# Patient Record
Sex: FEMALE | Race: WHITE
[De-identification: names, ages, dates, MRNs, and addresses within clinical notes are randomized per-mention and may not be internally consistent; named-entity substitution may affect disease eponyms.]

---

## 2021-05-22 ENCOUNTER — HOSPITAL ENCOUNTER (INPATIENT)
Dept: HOSPITAL 19 - LDRO | Age: 26
LOS: 1 days | Discharge: HOME | End: 2021-05-23
Attending: OBSTETRICS & GYNECOLOGY | Admitting: OBSTETRICS & GYNECOLOGY
Payer: COMMERCIAL

## 2021-05-22 VITALS — BODY MASS INDEX: 27.22 KG/M2 | HEIGHT: 65 IN | WEIGHT: 163.36 LBS

## 2021-05-22 VITALS — HEART RATE: 73 BPM | DIASTOLIC BLOOD PRESSURE: 87 MMHG | SYSTOLIC BLOOD PRESSURE: 138 MMHG

## 2021-05-22 VITALS — HEART RATE: 82 BPM | SYSTOLIC BLOOD PRESSURE: 135 MMHG | DIASTOLIC BLOOD PRESSURE: 69 MMHG

## 2021-05-22 VITALS — HEART RATE: 73 BPM | SYSTOLIC BLOOD PRESSURE: 145 MMHG | DIASTOLIC BLOOD PRESSURE: 89 MMHG

## 2021-05-22 VITALS — DIASTOLIC BLOOD PRESSURE: 73 MMHG | SYSTOLIC BLOOD PRESSURE: 134 MMHG | HEART RATE: 82 BPM

## 2021-05-22 VITALS — DIASTOLIC BLOOD PRESSURE: 67 MMHG | HEART RATE: 90 BPM | SYSTOLIC BLOOD PRESSURE: 111 MMHG | TEMPERATURE: 98.1 F

## 2021-05-22 VITALS — SYSTOLIC BLOOD PRESSURE: 137 MMHG | DIASTOLIC BLOOD PRESSURE: 82 MMHG

## 2021-05-22 VITALS — SYSTOLIC BLOOD PRESSURE: 144 MMHG | DIASTOLIC BLOOD PRESSURE: 90 MMHG | HEART RATE: 86 BPM

## 2021-05-22 DIAGNOSIS — Z3A.38: ICD-10-CM

## 2021-05-22 DIAGNOSIS — F41.9: ICD-10-CM

## 2021-05-22 DIAGNOSIS — D64.9: ICD-10-CM

## 2021-05-22 LAB
BASOPHILS # BLD: 0.1 10*3/UL (ref 0–0.2)
BASOPHILS NFR BLD AUTO: 0.6 % (ref 0–2)
EOSINOPHIL # BLD: 0.1 10*3/UL (ref 0–0.7)
EOSINOPHIL NFR BLD: 0.9 % (ref 0–4)
ERYTHROCYTE [DISTWIDTH] IN BLOOD BY AUTOMATED COUNT: 15.5 % (ref 11.5–14.5)
GRANULOCYTES # BLD AUTO: 56.3 % (ref 42.2–75.2)
HCT VFR BLD AUTO: 31.3 % (ref 37–47)
HGB BLD-MCNC: 10.1 G/DL (ref 12.5–16)
LYMPHOCYTES # BLD: 3.8 10*3/UL (ref 1.2–3.4)
LYMPHOCYTES NFR BLD: 32.9 % (ref 20–51)
MCH RBC QN AUTO: 25 PG (ref 27–31)
MCHC RBC AUTO-ENTMCNC: 32 G/DL (ref 33–37)
MCV RBC AUTO: 78 FL (ref 80–100)
MONOCYTES # BLD: 1 10*3/UL (ref 0.1–0.6)
MONOCYTES NFR BLD AUTO: 8.9 % (ref 1.7–9.3)
NEUTROPHILS # BLD: 6.5 10*3/UL (ref 1.4–6.5)
PLATELET # BLD AUTO: 365 K/MM3 (ref 130–400)
PMV BLD AUTO: 11.7 FL (ref 7.4–10.4)
RBC # BLD AUTO: 4.03 M/MM3 (ref 4.1–5.3)

## 2021-05-22 NOTE — NUR
2030- CONSENTS SIGNED BY .
2120- IV TO SALINE LOCK, PT ASSISTED TO BATHROOM. ABLE TO VOID 700ML WITHOUT
DIFFICULTY. PT PERFORMS PERICARE. CLEAN GOWN, PAD, PANTIES PROVIDED. NORMAL
LOCHIA DISCUSSED AND PT VERBALIZES UNDERSTANDING.
2140- PT ASSISTED TO AMBULATE TO POSTPARTUM ROOM 208. BELONGINGS WITH PT. PT
ORIENTED TO ROOM AND CALL LIGHTS. PLAN OF CARE DISCUSSED AND QUESTIONS
ANSWERED. PT REQUESTS MOTRIN AND SOMETHING TO EAT.

## 2021-05-22 NOTE — NUR
1846- PT PRESENTS TO LDR COMPLAINING OF LEAKING FLUID, AMBULATORY TO ROOM LR5,
CHANGED INTO GOWN.
1854- EFM X2 APPLIED. PT HAS BEEN SQUATTING NEXT TO BED LEAKING FLUID. FLUID
TESTS POSITIVE FOR AMNIOTIC FLUID, IT IS CLEAR. PT DENIES VAG BLEEDING AND
STATES SHE IS VIRI REGULARLY ALL DAY AND IS FEELING BABY MOVE. PLAN OF
CARE FOR LABOR DISCUSSED AND QUESTIONS ANSWERED.
1900- SVE BY THIS NURSE 4/90/-2 WITH COPIOUS AMOUNTS OF CLEAR FLUID. ADMISSION
DISCUSSED
1910- NURSING ADMISSION HISTORY AND ASSESSMENT COMPLETE. DR GOODPASTURE CALLED
AND UPDATED AS CHARTED, ORDERS FOR ADMISSION.
1925- IV START TO LEFT WRIST AS CHARTED, BLOOD DRAWN FOR LAB. LR INFUSING FOR
EPIDURAL. PT REPORTS INCREASING DISCOMFORT.
1930- DUANE CRNA CALLED FOR EPIDURAL PLACEMENT.
1935- PT IS HEARD SCREAMING FROM ANOTHER ROOM AND NURSE TO BEDSIDE. PT IS
SQUATTING AT HEAD OF BED BEARING DOWN WITH  AND MOTHER AT BEDSIDE. PT
REPORTS PRESSURE AND NEED FOR BOWEL MOVEMENT. SVE BY THIS NURSE IN HANDS AND
KNEES, COMPLETE AND +3. PT ENCOURAGED NOT TO PUSH, TO BREATHE THROUGH
CONTRACTIONS. PT REPORTS THAT SHE CANNOT CONTROL IT. NURSERY AND ANOTHER 
DECHANT RN CALLED TO BEDSIDE TO ASSIST.
1938- DR GOODPASTURE CALLED FOR IMMINENT DELIVERY, SHE IS ON HER WAY.
1940- PT CONTINUES TO PUSH SPONTANEOUSLY AND DELIVERS ON HANDS AND KNEES WITH
THIS NURSE IN ATTENDENCE, VIABLE FEMALE, VIGOROUS, TO CARE OF NURSERY STAFF.
1942- PT ASSISTED TO LAY DOWN IN BED AND CLEAN UNDERBUTT PAD TO AWAIT DR
GOODPASTURE FOR DELIVERY OF PLACENTA.
1946- DR GOODPASTURE AT BEDSIDE. PT POSITIONED IN FOOTPLATES.
1949- SPONTANEOUS DELIVERY OF PLACENTA, EXAMINED BY DR GOODPASTURE, NO REPAIR
NEEDED. PITOCIN INFUSING AT POSTPARTUM DOSE.
1955- PERICARE PROVIDED. ICEPACK TO PERINEUM. FEET DOWN FROM FOOTPLATES AND
POSTPARTUM RECOVERY STARTED.

## 2021-05-23 VITALS — SYSTOLIC BLOOD PRESSURE: 133 MMHG | TEMPERATURE: 98.2 F | DIASTOLIC BLOOD PRESSURE: 70 MMHG | HEART RATE: 7 BPM

## 2021-05-23 VITALS — DIASTOLIC BLOOD PRESSURE: 85 MMHG | HEART RATE: 93 BPM | SYSTOLIC BLOOD PRESSURE: 129 MMHG | TEMPERATURE: 98 F

## 2021-05-23 VITALS — SYSTOLIC BLOOD PRESSURE: 129 MMHG | HEART RATE: 93 BPM | TEMPERATURE: 98 F | DIASTOLIC BLOOD PRESSURE: 85 MMHG

## 2021-05-23 VITALS — TEMPERATURE: 98.5 F | DIASTOLIC BLOOD PRESSURE: 80 MMHG | HEART RATE: 69 BPM | SYSTOLIC BLOOD PRESSURE: 134 MMHG

## 2021-05-23 VITALS — TEMPERATURE: 97.7 F | DIASTOLIC BLOOD PRESSURE: 66 MMHG | HEART RATE: 72 BPM | SYSTOLIC BLOOD PRESSURE: 120 MMHG

## 2021-05-23 VITALS — DIASTOLIC BLOOD PRESSURE: 75 MMHG | HEART RATE: 78 BPM | TEMPERATURE: 97.9 F | SYSTOLIC BLOOD PRESSURE: 135 MMHG

## 2021-05-23 VITALS — HEART RATE: 84 BPM | DIASTOLIC BLOOD PRESSURE: 80 MMHG | TEMPERATURE: 97.8 F | SYSTOLIC BLOOD PRESSURE: 125 MMHG

## 2023-02-13 ENCOUNTER — HOSPITAL ENCOUNTER (INPATIENT)
Dept: HOSPITAL 19 - LDR | Age: 28
LOS: 2 days | Discharge: HOME | End: 2023-02-15
Attending: OBSTETRICS & GYNECOLOGY | Admitting: OBSTETRICS & GYNECOLOGY
Payer: COMMERCIAL

## 2023-02-13 VITALS — HEIGHT: 65 IN | WEIGHT: 160.28 LBS | BODY MASS INDEX: 26.7 KG/M2

## 2023-02-13 DIAGNOSIS — F41.9: ICD-10-CM

## 2023-02-13 DIAGNOSIS — Z3A.40: ICD-10-CM

## 2023-02-13 DIAGNOSIS — O48.0: Primary | ICD-10-CM

## 2023-02-13 DIAGNOSIS — Z23: ICD-10-CM

## 2023-02-13 DIAGNOSIS — Z86.16: ICD-10-CM

## 2023-02-14 VITALS — DIASTOLIC BLOOD PRESSURE: 64 MMHG | SYSTOLIC BLOOD PRESSURE: 109 MMHG | HEART RATE: 88 BPM | TEMPERATURE: 97.8 F

## 2023-02-14 VITALS — TEMPERATURE: 97.8 F | SYSTOLIC BLOOD PRESSURE: 123 MMHG | DIASTOLIC BLOOD PRESSURE: 67 MMHG | HEART RATE: 68 BPM

## 2023-02-14 VITALS — SYSTOLIC BLOOD PRESSURE: 138 MMHG | DIASTOLIC BLOOD PRESSURE: 78 MMHG | HEART RATE: 83 BPM | TEMPERATURE: 96.5 F

## 2023-02-14 VITALS — DIASTOLIC BLOOD PRESSURE: 66 MMHG | SYSTOLIC BLOOD PRESSURE: 114 MMHG | HEART RATE: 95 BPM

## 2023-02-14 VITALS — HEART RATE: 64 BPM | TEMPERATURE: 98.2 F | DIASTOLIC BLOOD PRESSURE: 73 MMHG | SYSTOLIC BLOOD PRESSURE: 112 MMHG

## 2023-02-14 VITALS — SYSTOLIC BLOOD PRESSURE: 128 MMHG | HEART RATE: 80 BPM | DIASTOLIC BLOOD PRESSURE: 58 MMHG

## 2023-02-14 VITALS — HEART RATE: 86 BPM | SYSTOLIC BLOOD PRESSURE: 124 MMHG | DIASTOLIC BLOOD PRESSURE: 66 MMHG

## 2023-02-14 VITALS — DIASTOLIC BLOOD PRESSURE: 59 MMHG | HEART RATE: 72 BPM | TEMPERATURE: 98 F | SYSTOLIC BLOOD PRESSURE: 112 MMHG

## 2023-02-14 VITALS — DIASTOLIC BLOOD PRESSURE: 89 MMHG | HEART RATE: 84 BPM | SYSTOLIC BLOOD PRESSURE: 142 MMHG | TEMPERATURE: 98.3 F

## 2023-02-14 VITALS — SYSTOLIC BLOOD PRESSURE: 113 MMHG | HEART RATE: 83 BPM | DIASTOLIC BLOOD PRESSURE: 62 MMHG

## 2023-02-14 VITALS — HEART RATE: 117 BPM | TEMPERATURE: 98 F

## 2023-02-14 VITALS — TEMPERATURE: 98.1 F

## 2023-02-14 LAB
BASOPHILS # BLD: 0.1 K/MM3 (ref 0–0.2)
BASOPHILS NFR BLD AUTO: 0.5 % (ref 0–2)
EOSINOPHIL # BLD: 0.1 K/MM3 (ref 0–0.7)
EOSINOPHIL NFR BLD: 1.2 % (ref 0–4)
ERYTHROCYTE [DISTWIDTH] IN BLOOD BY AUTOMATED COUNT: 15.8 % (ref 11.5–14.5)
GRANULOCYTES # BLD AUTO: 59.8 % (ref 42.2–75.2)
HCT VFR BLD AUTO: 30.2 % (ref 37–47)
HGB BLD-MCNC: 9.8 G/DL (ref 12.5–16)
LYMPHOCYTES # BLD: 3.2 K/MM3 (ref 1.2–3.4)
LYMPHOCYTES NFR BLD: 29.6 % (ref 20–51)
MCH RBC QN AUTO: 24 PG (ref 27–31)
MCHC RBC AUTO-ENTMCNC: 33 G/DL (ref 33–37)
MCV RBC AUTO: 75 FL (ref 80–100)
MONOCYTES # BLD: 0.9 K/MM3 (ref 0.1–0.6)
MONOCYTES NFR BLD AUTO: 8.4 % (ref 1.7–9.3)
NEUTROPHILS # BLD: 6.6 K/MM3 (ref 1.4–6.5)
PLATELET # BLD AUTO: 324 K/MM3 (ref 130–400)
PMV BLD AUTO: 10.5 FL (ref 7.4–10.4)
RBC # BLD AUTO: 4.01 M/MM3 (ref 4.1–5.3)

## 2023-02-14 NOTE — NUR
Assumed care of patient. Rests in bed, alert. Baby at warmer getting checked
out by nursery nurse. Denies any needs at this time.

## 2023-02-14 NOTE — NUR
Care relinquished from Lindsey Carreno RN. This nurse at pt bedside for
introductions and discussion of POC. Questions, concerns, and needs
encouraged. Pt verbalized understanding and agreement of POC with "no"
questions, concerns, or needs.

## 2023-02-14 NOTE — NUR
0100  ADM TO LR6 WITH C/O SROM AT MIDNIGHT AND CONTRACTIONS BECOMING STRONGER
AND HARDER SINCE THEN.  SCHEDULED FOR A MORNING INDUCTIONS.  TO BED AND EFM
ON.  SVE 6/80/-2 POSTERIOR CERVIX AND BOW INTACT.  AMNIOTRACE DONE AND NEG.
DR ROLES HERE AND REPORT GIVEN.  ADM ORDERS RECEIVED.  REQUEST EPID
0120  IV STARTED.  BREATHING WELL WITH CONTRACTIONS.

## 2023-02-14 NOTE — NUR
[facility] for pt to call back. Pt resting in bed with pt spouse during this nurse hourly rounding at 2100. Pt
denies needs at this time.

## 2023-02-14 NOTE — NUR
This nurse at pt bedside for hourly rounding at 1900. Pt cuddling with baby in
bed. VS and assessments obtained. Pt educated on whiteboard and postpartum
packet. POC discussed with pt. Questions, concerns, and needs encouraged. Pt
verbalized understanding and agreement of POC with "no" questions, concerns,
or needs.

## 2023-02-14 NOTE — NUR
0130: Pt maneuvered into sitting position for epidural placement. Pt back
exposed for Allyssa Rollins CRNA ease and access to place pt epidural. Risks
and benefits discussed.
 
0134: TD administered by Allyssa Rollins CRNA.

## 2023-02-14 NOTE — NUR
Pt is attempting to breastfeed during this nurse hourly rounding at 2300. VS
obtained and scheduled medication administered.

## 2023-02-14 NOTE — NUR
Pt starts pushing with ctx at 0427. FHR audible throughout the room. This
nurse remains at pt bedside throughout whole second stage, palpating pt
abdomen attempting to readjust external monitors x2.

## 2023-02-14 NOTE — NUR
0454:  of viable baby girl at 0454. Baby to mothers abdomen. Cord clamped
by Dr. Guerrero and cut by FOB. Baby care assumed by Edwin Quijano RN.
Spontaneous delivery of intact placenta at 0458. Postpartum Pitocin started at
333 mL/hr per protocol. Pt delivered intact. Postpartum recovery started at
0500.

## 2023-02-14 NOTE — NUR
0310: SVE, with pt consent and explanation, 7-8/95/-1. This nurse AROM during
SVE, light meconium stained fluid noted.

## 2023-02-14 NOTE — NUR
Ambulates to the bathroom after eating granola bar. Tolerates well, voids
moderate amount of clear yellow urine. Per-care done by patient. Gown on, ice
pack with woody pad on. To room 216 via wheel chair. [place] to room, patients
mother at bedside. 0744 Ibuprofen 800 mg, tylenol 1000 mg given as ordered and
per request.

## 2023-02-15 VITALS — SYSTOLIC BLOOD PRESSURE: 124 MMHG | TEMPERATURE: 97.9 F | DIASTOLIC BLOOD PRESSURE: 80 MMHG | HEART RATE: 90 BPM

## 2023-02-15 VITALS — DIASTOLIC BLOOD PRESSURE: 61 MMHG | TEMPERATURE: 97.9 F | SYSTOLIC BLOOD PRESSURE: 118 MMHG | HEART RATE: 83 BPM

## 2023-02-15 NOTE — NUR
Initial visit; Patient thanked  for offering congratulations and God's
blessings for the birth of her daughter.  thanked mom for choosing our
hospital.